# Patient Record
Sex: FEMALE | Race: WHITE | NOT HISPANIC OR LATINO | Employment: OTHER | URBAN - NONMETROPOLITAN AREA
[De-identification: names, ages, dates, MRNs, and addresses within clinical notes are randomized per-mention and may not be internally consistent; named-entity substitution may affect disease eponyms.]

---

## 2023-06-02 ENCOUNTER — DOCUMENTATION (OUTPATIENT)
Dept: PRIMARY CARE | Facility: CLINIC | Age: 63
End: 2023-06-02
Payer: COMMERCIAL

## 2023-06-02 ENCOUNTER — PATIENT OUTREACH (OUTPATIENT)
Dept: CARE COORDINATION | Facility: CLINIC | Age: 63
End: 2023-06-02
Payer: COMMERCIAL

## 2023-06-02 RX ORDER — BISOPROLOL FUMARATE 5 MG/1
2.5 TABLET, FILM COATED ORAL DAILY
COMMUNITY
Start: 2023-06-01

## 2023-06-02 RX ORDER — AMIODARONE HYDROCHLORIDE 200 MG/1
200 TABLET ORAL
COMMUNITY
Start: 2023-06-01

## 2023-06-02 RX ORDER — FLUCONAZOLE 200 MG/1
800 TABLET ORAL
Qty: 48 TABLET | Refills: 0 | COMMUNITY
Start: 2023-06-01 | End: 2023-06-13

## 2023-06-02 RX ORDER — SULFAMETHOXAZOLE AND TRIMETHOPRIM 800; 160 MG/1; MG/1
1 TABLET ORAL 2 TIMES DAILY
Qty: 24 TABLET | Refills: 0 | COMMUNITY
Start: 2023-06-01 | End: 2023-06-13

## 2023-06-05 ENCOUNTER — TELEPHONE (OUTPATIENT)
Dept: PRIMARY CARE | Facility: CLINIC | Age: 63
End: 2023-06-05

## 2023-06-05 NOTE — TELEPHONE ENCOUNTER
----- Message from Fatimah Marie RN sent at 6/2/2023  2:32 PM EDT -----  Regarding: hosp fu  Discharge facility: [facility]  Discharge diagnosis:   Small bowel obstruction, afib, hx breast ca with mets colon  Date of discharge:      6/1/23  Unsuccessful attempts x2 to reach patient for PCP Follow-up  Please have office staff reach out to patient and schedule an appointment within 7-13 days from discharge date. She may be closely following with heme/onc. Thank you!

## 2023-06-12 DIAGNOSIS — F43.0 ACUTE STRESS REACTION: ICD-10-CM

## 2023-06-12 RX ORDER — SERTRALINE HYDROCHLORIDE 50 MG/1
TABLET, FILM COATED ORAL
Qty: 90 TABLET | Refills: 0 | Status: SHIPPED | OUTPATIENT
Start: 2023-06-12

## 2023-06-28 ENCOUNTER — TELEPHONE (OUTPATIENT)
Dept: PRIMARY CARE | Facility: CLINIC | Age: 63
End: 2023-06-28
Payer: COMMERCIAL

## 2023-06-28 NOTE — TELEPHONE ENCOUNTER
----- Message from Fatimah Marie RN sent at 6/28/2023  7:45 AM EDT -----  Regarding: DC home  Hi Lexie,    Just FYI update found on this patient, she was discharged home on hospice care 6/27 so since under care of hospice will not complete TCM. Just wanted to update you all, thank you!

## 2023-06-28 NOTE — TELEPHONE ENCOUNTER
Please see other message from TCM pool-patient discharged on hospice. TCM pool not reaching out to patient. Please advise if further action needed

## 2023-07-03 ENCOUNTER — DOCUMENTATION (OUTPATIENT)
Dept: PRIMARY CARE | Facility: CLINIC | Age: 63
End: 2023-07-03
Payer: COMMERCIAL

## 2024-01-03 PROBLEM — T80.212A PORT OR RESERVOIR INFECTION: Status: RESOLVED | Noted: 2023-05-31 | Resolved: 2024-01-03

## 2024-01-03 PROBLEM — Z86.010 PERSONAL HISTORY OF COLONIC POLYPS: Status: ACTIVE | Noted: 2024-01-03

## 2024-01-03 PROBLEM — R11.2 CHEMOTHERAPY INDUCED NAUSEA AND VOMITING: Status: ACTIVE | Noted: 2023-05-26

## 2024-01-03 PROBLEM — H43.393 VITREOUS FLOATERS, BILATERAL: Status: ACTIVE | Noted: 2018-04-26

## 2024-01-03 PROBLEM — T80.212A PORT OR RESERVOIR INFECTION: Status: ACTIVE | Noted: 2023-05-31

## 2024-01-03 PROBLEM — E66.9 OBESITY, CLASS II, BMI 35-39.9: Status: RESOLVED | Noted: 2023-06-14 | Resolved: 2024-01-03

## 2024-01-03 PROBLEM — Z85.038 H/O COLON CANCER, STAGE IV: Status: ACTIVE | Noted: 2023-05-26

## 2024-01-03 PROBLEM — I10 BENIGN ESSENTIAL HYPERTENSION: Status: ACTIVE | Noted: 2020-02-13

## 2024-01-03 PROBLEM — F43.0 ACUTE STRESS DISORDER: Status: ACTIVE | Noted: 2022-11-15

## 2024-01-03 PROBLEM — G62.0 NEUROPATHY DUE TO CHEMOTHERAPEUTIC DRUG (MULTI): Status: ACTIVE | Noted: 2022-02-01

## 2024-01-03 PROBLEM — T14.8XXA WOUND, OPEN: Status: RESOLVED | Noted: 2023-06-16 | Resolved: 2024-01-03

## 2024-01-03 PROBLEM — M72.0 DUPUYTRENS CONTRACTURE: Status: RESOLVED | Noted: 2024-01-03 | Resolved: 2024-01-03

## 2024-01-03 PROBLEM — E66.9 OBESITY, CLASS I, BMI 30-34.9: Status: RESOLVED | Noted: 2023-05-30 | Resolved: 2024-01-03

## 2024-01-03 PROBLEM — B02.29 HZV (HERPES ZOSTER VIRUS) POST HERPETIC NEURALGIA: Status: ACTIVE | Noted: 2018-04-26

## 2024-01-03 PROBLEM — R78.81 BACTEREMIA: Status: RESOLVED | Noted: 2023-05-30 | Resolved: 2024-01-03

## 2024-01-03 PROBLEM — R19.8 PERFORATED VISCUS: Status: RESOLVED | Noted: 2023-06-04 | Resolved: 2024-01-03

## 2024-01-03 PROBLEM — F39 MOOD DISORDER (CMS-HCC): Status: ACTIVE | Noted: 2023-05-25

## 2024-01-03 PROBLEM — E86.0 DEHYDRATION: Status: ACTIVE | Noted: 2021-08-26

## 2024-01-03 PROBLEM — E44.0 MALNUTRITION OF MODERATE DEGREE (MULTI): Status: ACTIVE | Noted: 2023-06-19

## 2024-01-03 PROBLEM — J90 PLEURAL EFFUSION: Status: ACTIVE | Noted: 2023-06-16

## 2024-01-03 PROBLEM — K63.1 COLON PERFORATION (MULTI): Status: ACTIVE | Noted: 2023-06-22

## 2024-01-03 PROBLEM — I72.8 SPLENIC ARTERY ANEURYSM (CMS-HCC): Status: ACTIVE | Noted: 2024-01-03

## 2024-01-03 PROBLEM — C18.9 MALIGNANT NEOPLASM OF COLON (MULTI): Status: ACTIVE | Noted: 2022-05-04

## 2024-01-03 PROBLEM — E87.1 HYPONATREMIA: Status: ACTIVE | Noted: 2023-05-25

## 2024-01-03 PROBLEM — J96.22 ACUTE ON CHRONIC RESPIRATORY FAILURE WITH HYPERCAPNIA (MULTI): Status: ACTIVE | Noted: 2023-06-19

## 2024-01-03 PROBLEM — H81.12 BENIGN PAROXYSMAL POSITIONAL VERTIGO OF LEFT EAR: Status: ACTIVE | Noted: 2024-01-03

## 2024-01-03 PROBLEM — K63.0 PERICOLONIC ABSCESS: Status: RESOLVED | Noted: 2023-06-22 | Resolved: 2024-01-03

## 2024-01-03 PROBLEM — M72.0 DUPUYTREN'S DISEASE OF PALM OF RIGHT HAND: Status: ACTIVE | Noted: 2024-01-03

## 2024-01-03 PROBLEM — Z90.11 STATUS POST RIGHT MASTECTOMY: Status: ACTIVE | Noted: 2023-06-06

## 2024-01-03 PROBLEM — B25.8: Status: ACTIVE | Noted: 2020-08-17

## 2024-01-03 PROBLEM — E66.9 OBESITY, CLASS I, BMI 30-34.9: Status: ACTIVE | Noted: 2023-05-30

## 2024-01-03 PROBLEM — E86.0 DEHYDRATION: Status: RESOLVED | Noted: 2021-08-26 | Resolved: 2024-01-03

## 2024-01-03 PROBLEM — H16.143 PUNCTATE KERATITIS, BILATERAL: Status: ACTIVE | Noted: 2018-04-26

## 2024-01-03 PROBLEM — Z85.3 HISTORY OF BREAST CANCER: Status: RESOLVED | Noted: 2020-02-13 | Resolved: 2024-01-03

## 2024-01-03 PROBLEM — R73.9 STRESS HYPERGLYCEMIA: Status: ACTIVE | Noted: 2023-06-15

## 2024-01-03 PROBLEM — M54.9 BACK PAIN: Status: ACTIVE | Noted: 2024-01-03

## 2024-01-03 PROBLEM — C50.911 MALIGNANT NEOPLASM OF RIGHT FEMALE BREAST (MULTI): Status: ACTIVE | Noted: 2024-01-03

## 2024-01-03 PROBLEM — E43 SEVERE PROTEIN-CALORIE MALNUTRITION (MULTI): Status: ACTIVE | Noted: 2023-05-31

## 2024-01-03 PROBLEM — K21.9 GASTROESOPHAGEAL REFLUX DISEASE WITHOUT ESOPHAGITIS: Status: ACTIVE | Noted: 2020-02-13

## 2024-01-03 PROBLEM — G89.3 CANCER RELATED PAIN: Status: ACTIVE | Noted: 2023-06-07

## 2024-01-03 PROBLEM — K63.0 PERICOLONIC ABSCESS: Status: ACTIVE | Noted: 2023-06-22

## 2024-01-03 PROBLEM — K57.92 DIVERTICULITIS: Status: ACTIVE | Noted: 2023-05-25

## 2024-01-03 PROBLEM — C50.911: Status: ACTIVE | Noted: 2022-04-25

## 2024-01-03 PROBLEM — K63.1 COLON PERFORATION (MULTI): Status: RESOLVED | Noted: 2023-06-22 | Resolved: 2024-01-03

## 2024-01-03 PROBLEM — M19.031 PRIMARY OSTEOARTHRITIS OF RIGHT WRIST: Status: ACTIVE | Noted: 2024-01-03

## 2024-01-03 PROBLEM — B25.8: Status: RESOLVED | Noted: 2020-08-17 | Resolved: 2024-01-03

## 2024-01-03 PROBLEM — K57.92 DIVERTICULITIS: Status: RESOLVED | Noted: 2023-05-25 | Resolved: 2024-01-03

## 2024-01-03 PROBLEM — E44.0 MODERATE PROTEIN-CALORIE MALNUTRITION (MULTI): Status: ACTIVE | Noted: 2023-06-23

## 2024-01-03 PROBLEM — M25.552 PAIN IN LEFT HIP: Status: ACTIVE | Noted: 2020-03-27

## 2024-01-03 PROBLEM — R06.89 ACUTE RESPIRATORY INSUFFICIENCY: Status: RESOLVED | Noted: 2023-06-17 | Resolved: 2024-01-03

## 2024-01-03 PROBLEM — R23.4 SKIN FISSURES: Status: ACTIVE | Noted: 2023-06-12

## 2024-01-03 PROBLEM — K56.609 SMALL BOWEL OBSTRUCTION (MULTI): Status: RESOLVED | Noted: 2023-05-25 | Resolved: 2024-01-03

## 2024-01-03 PROBLEM — C50.911 MALIGNANT NEOPLASM OF RIGHT FEMALE BREAST (MULTI): Status: RESOLVED | Noted: 2024-01-03 | Resolved: 2024-01-03

## 2024-01-03 PROBLEM — M17.9 OA (OSTEOARTHRITIS) OF KNEE: Status: ACTIVE | Noted: 2020-02-24

## 2024-01-03 PROBLEM — T45.1X5A CHEMOTHERAPY INDUCED NAUSEA AND VOMITING: Status: ACTIVE | Noted: 2023-05-26

## 2024-01-03 PROBLEM — R92.8 ABNORMAL MAMMOGRAM: Status: RESOLVED | Noted: 2024-01-03 | Resolved: 2024-01-03

## 2024-01-03 PROBLEM — I26.94 MULTIPLE SUBSEGMENTAL PULMONARY EMBOLI WITHOUT ACUTE COR PULMONALE (MULTI): Status: ACTIVE | Noted: 2022-11-15

## 2024-01-03 PROBLEM — R23.4 SKIN FISSURES: Status: RESOLVED | Noted: 2023-06-12 | Resolved: 2024-01-03

## 2024-01-03 PROBLEM — R23.8 SKIN BREAKDOWN: Status: ACTIVE | Noted: 2023-06-19

## 2024-01-03 PROBLEM — J90 PLEURAL EFFUSION: Status: RESOLVED | Noted: 2023-06-16 | Resolved: 2024-01-03

## 2024-01-03 PROBLEM — E78.2 MIXED HYPERLIPIDEMIA: Status: ACTIVE | Noted: 2024-01-03

## 2024-01-03 PROBLEM — E86.9 VOLUME DEPLETION: Status: ACTIVE | Noted: 2023-05-27

## 2024-01-03 PROBLEM — R73.9 STRESS HYPERGLYCEMIA: Status: RESOLVED | Noted: 2023-06-15 | Resolved: 2024-01-03

## 2024-01-03 PROBLEM — A41.9 SEPSIS (MULTI): Status: RESOLVED | Noted: 2023-06-04 | Resolved: 2024-01-03

## 2024-01-03 PROBLEM — D50.0 IRON DEFICIENCY ANEMIA DUE TO CHRONIC BLOOD LOSS: Status: ACTIVE | Noted: 2024-01-03

## 2024-01-03 PROBLEM — K57.90 DIVERTICULOSIS: Status: ACTIVE | Noted: 2024-01-03

## 2024-01-03 PROBLEM — R06.89 ACUTE RESPIRATORY INSUFFICIENCY: Status: ACTIVE | Noted: 2023-06-17

## 2024-01-03 PROBLEM — C80.0 CARCINOMATOSIS (MULTI): Status: ACTIVE | Noted: 2023-06-04

## 2024-01-03 PROBLEM — T14.8XXA WOUND, OPEN: Status: ACTIVE | Noted: 2023-06-16

## 2024-01-03 PROBLEM — R19.7 DIARRHEA: Status: ACTIVE | Noted: 2024-01-03

## 2024-01-03 PROBLEM — Z85.038 H/O COLON CANCER, STAGE IV: Status: RESOLVED | Noted: 2023-05-26 | Resolved: 2024-01-03

## 2024-01-03 PROBLEM — J34.89 RHINORRHEA: Status: RESOLVED | Noted: 2024-01-03 | Resolved: 2024-01-03

## 2024-01-03 PROBLEM — D72.819 LEUKOPENIA: Status: ACTIVE | Noted: 2023-05-25

## 2024-01-03 PROBLEM — E66.9 OBESITY, CLASS II, BMI 35-39.9: Status: ACTIVE | Noted: 2023-06-14

## 2024-01-03 PROBLEM — G47.33 OBSTRUCTIVE SLEEP APNEA: Status: ACTIVE | Noted: 2020-02-13

## 2024-01-03 PROBLEM — M72.0 DUPUYTRENS CONTRACTURE: Status: ACTIVE | Noted: 2024-01-03

## 2024-01-03 PROBLEM — J96.22 ACUTE ON CHRONIC RESPIRATORY FAILURE WITH HYPERCAPNIA (MULTI): Status: RESOLVED | Noted: 2023-06-19 | Resolved: 2024-01-03

## 2024-01-03 PROBLEM — T45.1X5A NEUROPATHY DUE TO CHEMOTHERAPEUTIC DRUG (MULTI): Status: ACTIVE | Noted: 2022-02-01

## 2024-01-03 PROBLEM — R73.03 PREDIABETES: Status: ACTIVE | Noted: 2020-02-13

## 2024-01-03 PROBLEM — R63.30 FEEDING DIFFICULTIES: Status: ACTIVE | Noted: 2023-06-22

## 2024-01-03 PROBLEM — R19.8 PERFORATED VISCUS: Status: ACTIVE | Noted: 2023-06-04

## 2024-01-03 PROBLEM — I48.0 PAF (PAROXYSMAL ATRIAL FIBRILLATION) (MULTI): Status: ACTIVE | Noted: 2023-05-27

## 2024-01-03 PROBLEM — C79.9 METASTATIC ADENOCARCINOMA (MULTI): Status: ACTIVE | Noted: 2023-06-22

## 2024-01-03 PROBLEM — Z85.3 HISTORY OF BREAST CANCER: Status: ACTIVE | Noted: 2020-02-13

## 2024-01-03 PROBLEM — A41.9 SEPSIS (MULTI): Status: ACTIVE | Noted: 2023-06-04

## 2024-01-03 PROBLEM — Z96.642 STATUS POST TOTAL HIP REPLACEMENT, LEFT: Status: ACTIVE | Noted: 2020-02-24

## 2024-01-03 PROBLEM — T45.1X5A CHEMOTHERAPY-INDUCED NAUSEA: Status: ACTIVE | Noted: 2023-05-26

## 2024-01-03 PROBLEM — K56.609 SMALL BOWEL OBSTRUCTION (MULTI): Status: ACTIVE | Noted: 2023-05-25

## 2024-01-03 PROBLEM — C19: Status: ACTIVE | Noted: 2024-01-03

## 2024-01-03 PROBLEM — E83.42 HYPOMAGNESEMIA: Status: RESOLVED | Noted: 2023-05-26 | Resolved: 2024-01-03

## 2024-01-03 PROBLEM — R92.8 ABNORMAL MAMMOGRAM: Status: ACTIVE | Noted: 2024-01-03

## 2024-01-03 PROBLEM — J34.89 RHINORRHEA: Status: ACTIVE | Noted: 2024-01-03

## 2024-01-03 PROBLEM — R78.81 BACTEREMIA: Status: ACTIVE | Noted: 2023-05-30

## 2024-01-03 PROBLEM — R23.8 SKIN BREAKDOWN: Status: RESOLVED | Noted: 2023-06-19 | Resolved: 2024-01-03

## 2024-01-03 PROBLEM — E83.42 HYPOMAGNESEMIA: Status: ACTIVE | Noted: 2023-05-26

## 2024-01-03 PROBLEM — R11.0 CHEMOTHERAPY-INDUCED NAUSEA: Status: ACTIVE | Noted: 2023-05-26

## 2024-01-03 PROBLEM — M16.12 PRIMARY OSTEOARTHRITIS OF LEFT HIP: Status: ACTIVE | Noted: 2024-01-03

## 2024-01-03 PROBLEM — E86.9 VOLUME DEPLETION: Status: RESOLVED | Noted: 2023-05-27 | Resolved: 2024-01-03
